# Patient Record
Sex: MALE | Race: BLACK OR AFRICAN AMERICAN | Employment: FULL TIME | URBAN - METROPOLITAN AREA
[De-identification: names, ages, dates, MRNs, and addresses within clinical notes are randomized per-mention and may not be internally consistent; named-entity substitution may affect disease eponyms.]

---

## 2022-03-19 PROBLEM — Z88.9 H/O SEASONAL ALLERGIES: Status: ACTIVE | Noted: 2017-09-22

## 2022-07-21 RX ORDER — FLUTICASONE PROPIONATE AND SALMETEROL 100; 50 UG/1; UG/1
1 POWDER RESPIRATORY (INHALATION) EVERY 12 HOURS
COMMUNITY
Start: 2022-05-03

## 2022-07-21 RX ORDER — LORATADINE 10 MG/1
10 TABLET ORAL DAILY
COMMUNITY

## 2022-07-21 RX ORDER — ALBUTEROL SULFATE 90 UG/1
2 AEROSOL, METERED RESPIRATORY (INHALATION) EVERY 4 HOURS PRN
COMMUNITY
Start: 2022-02-14

## 2022-07-21 RX ORDER — BROMPHENIRAMINE MALEATE, PSEUDOEPHEDRINE HYDROCHLORIDE, AND DEXTROMETHORPHAN HYDROBROMIDE 2; 30; 10 MG/5ML; MG/5ML; MG/5ML
5 SYRUP ORAL 4 TIMES DAILY PRN
COMMUNITY
Start: 2022-05-02

## 2022-07-21 RX ORDER — AZELASTINE HYDROCHLORIDE, FLUTICASONE PROPIONATE 137; 50 UG/1; UG/1
SPRAY, METERED NASAL
COMMUNITY
Start: 2022-05-02

## 2022-07-21 RX ORDER — FLUTICASONE PROPIONATE 50 MCG
2 SPRAY, SUSPENSION (ML) NASAL PRN
COMMUNITY
Start: 2022-02-14

## 2022-07-21 RX ORDER — BUDESONIDE AND FORMOTEROL FUMARATE DIHYDRATE 80; 4.5 UG/1; UG/1
2 AEROSOL RESPIRATORY (INHALATION) 2 TIMES DAILY
COMMUNITY
End: 2022-09-06

## 2022-07-26 ENCOUNTER — OFFICE VISIT (OUTPATIENT)
Dept: OCCUPATIONAL MEDICINE | Age: 42
End: 2022-07-26

## 2022-07-26 DIAGNOSIS — B37.2 YEAST DERMATITIS: Primary | ICD-10-CM

## 2022-07-26 PROCEDURE — 99213 OFFICE O/P EST LOW 20 MIN: CPT | Performed by: NURSE PRACTITIONER

## 2022-07-28 ASSESSMENT — ENCOUNTER SYMPTOMS
ABDOMINAL PAIN: 0
SHORTNESS OF BREATH: 0

## 2022-07-28 NOTE — PROGRESS NOTES
PROGRESS NOTE    SUBJECTIVE:   Todd Grover is a 38 y.o. male seen for a follow up visit regarding Rash   Dermatitis: Patient complains of a rash. Symptoms began several weeks ago. Patient describes the rash as nonpigmented, excoriated, scattered. Characteristics of rash and associated history: Personal hx of allergies, asthma or hay fever? yes. Patient's previous dermatologic history includes eczema. Family history of derm problems: eczema. Medications currently using: none. Environmental exposures or allergies: none      Past Medical History, Past Surgical History, Family history, Social History, and Medications were all reviewed with the patient today and updated as necessary. Current Outpatient Medications   Medication Sig Dispense Refill    albuterol sulfate HFA (PROVENTIL;VENTOLIN;PROAIR) 108 (90 Base) MCG/ACT inhaler Inhale 2 puffs into the lungs every 4 hours as needed      Azelastine-Fluticasone 137-50 MCG/ACT SUSP SPRAY 1 SPRAY IN EACH NOSTRIL 2 TIMES A DAY      fluticasone (FLONASE) 50 MCG/ACT nasal spray 2 sprays by Nasal route as needed      fluticasone-salmeterol (ADVIAR) 100-50 MCG/ACT AEPB diskus inhaler Inhale 1 puff into the lungs every 12 hours      brompheniramine-pseudoephedrine-DM 2-30-10 MG/5ML syrup Take 5 mLs by mouth 4 times daily as needed      nystatin-triamcinolone (MYCOLOG II) 961928-6.1 UNIT/GM-% cream Apply topically 2 times daily. 30 g 0    budesonide-formoterol (SYMBICORT) 80-4.5 MCG/ACT AERO Inhale 2 puffs into the lungs 2 times daily      loratadine (CLARITIN) 10 MG tablet Take 10 mg by mouth in the morning. No current facility-administered medications for this visit. Not on File  Patient Active Problem List   Diagnosis    H/O seasonal allergies     Past Medical History:   Diagnosis Date    H/O seasonal allergies 9/22/2017     No past surgical history on file. No family history on file.   Social History     Tobacco Use    Smoking status: Never    Smokeless tobacco: Not on file   Substance Use Topics    Alcohol use: Yes       Review of Systems   Eyes:  Negative for visual disturbance. Respiratory:  Negative for shortness of breath. Cardiovascular:  Negative for chest pain, palpitations and leg swelling. Gastrointestinal:  Negative for abdominal pain. Skin:  Positive for rash. Neurological:  Negative for dizziness, syncope and light-headedness. OBJECTIVE:  There were no vitals taken for this visit. Physical Exam  Constitutional:       Appearance: Normal appearance. Cardiovascular:      Rate and Rhythm: Normal rate and regular rhythm. Pulmonary:      Effort: Pulmonary effort is normal.      Breath sounds: Normal breath sounds. Skin:     Findings: Erythema and rash present. Neurological:      Mental Status: He is alert. Rash is a red flat rash with sharp, scalloped edges. ASSESSMENT and PLAN    Alejandra Williamson was seen today for rash. Diagnoses and all orders for this visit:    Yeast dermatitis    Other orders  -     nystatin-triamcinolone (MYCOLOG II) 179350-7.1 UNIT/GM-% cream; Apply topically 2 times daily. Use cream on affected area  Reviewed to stay dry. Reviewed triggers, prevention and hygiene.

## 2022-09-06 ENCOUNTER — TELEPHONE (OUTPATIENT)
Dept: OCCUPATIONAL MEDICINE | Age: 42
End: 2022-09-06

## 2022-09-06 RX ORDER — BROMPHENIRAMINE MALEATE, PSEUDOEPHEDRINE HYDROCHLORIDE, AND DEXTROMETHORPHAN HYDROBROMIDE 2; 30; 10 MG/5ML; MG/5ML; MG/5ML
5 SYRUP ORAL 4 TIMES DAILY PRN
Qty: 210 ML | Refills: 0 | Status: SHIPPED | OUTPATIENT
Start: 2022-09-06

## 2022-09-06 RX ORDER — BUDESONIDE AND FORMOTEROL FUMARATE DIHYDRATE 160; 4.5 UG/1; UG/1
2 AEROSOL RESPIRATORY (INHALATION) 2 TIMES DAILY
Qty: 10.2 G | Refills: 5 | Status: SHIPPED | OUTPATIENT
Start: 2022-09-06

## 2022-09-06 RX ORDER — BUDESONIDE AND FORMOTEROL FUMARATE DIHYDRATE 160; 4.5 UG/1; UG/1
2 AEROSOL RESPIRATORY (INHALATION) 2 TIMES DAILY
COMMUNITY
Start: 2022-02-14 | End: 2022-09-06 | Stop reason: SDUPTHER

## 2022-11-03 ENCOUNTER — OFFICE VISIT (OUTPATIENT)
Dept: OCCUPATIONAL MEDICINE | Age: 42
End: 2022-11-03

## 2022-11-03 DIAGNOSIS — J01.10 ACUTE FRONTAL SINUSITIS, RECURRENCE NOT SPECIFIED: Primary | ICD-10-CM

## 2022-11-03 RX ORDER — PREDNISONE 10 MG/1
10 TABLET ORAL DAILY
Qty: 7 TABLET | Refills: 0 | Status: SHIPPED | OUTPATIENT
Start: 2022-11-03 | End: 2022-11-10

## 2022-11-03 RX ORDER — DOXYCYCLINE HYCLATE 100 MG
100 TABLET ORAL 2 TIMES DAILY
Qty: 14 TABLET | Refills: 0 | Status: SHIPPED | OUTPATIENT
Start: 2022-11-03 | End: 2022-11-10

## 2022-11-08 ASSESSMENT — ENCOUNTER SYMPTOMS
VOMITING: 0
NAUSEA: 0
SORE THROAT: 1
SHORTNESS OF BREATH: 0
COUGH: 0

## 2022-11-08 NOTE — PROGRESS NOTES
PROGRESS NOTE    SUBJECTIVE:   Todd Grover is a 38 y.o. male seen for a follow up visit regarding Sinusitis   The patient is a 38 y.o. male who presents with complaint of achiness, congestion, nasal congestion, post nasal drip, rash, sinus pressure, sneezing, and tooth pain with no fever, chills, night sweats or weight loss. Onset of symptoms was several days ago, gradually worsening since that time. Treatment to date: oral decongestant, nasal decongestant spray, saline spray/neti pot , antihistamines. Past Medical History, Past Surgical History, Family history, Social History, and Medications were all reviewed with the patient today and updated as necessary. Current Outpatient Medications   Medication Sig Dispense Refill    doxycycline hyclate (VIBRA-TABS) 100 MG tablet Take 1 tablet by mouth 2 times daily for 7 days 14 tablet 0    predniSONE (DELTASONE) 10 MG tablet Take 1 tablet by mouth daily for 7 days 7 tablet 0    brompheniramine-pseudoephedrine-DM 2-30-10 MG/5ML syrup Take 5 mLs by mouth 4 times daily as needed for Congestion or Cough 210 mL 0    budesonide-formoterol (SYMBICORT) 160-4.5 MCG/ACT AERO Inhale 2 puffs into the lungs 2 times daily 10.2 g 5    albuterol sulfate HFA (PROVENTIL;VENTOLIN;PROAIR) 108 (90 Base) MCG/ACT inhaler Inhale 2 puffs into the lungs every 4 hours as needed      Azelastine-Fluticasone 137-50 MCG/ACT SUSP SPRAY 1 SPRAY IN EACH NOSTRIL 2 TIMES A DAY      fluticasone (FLONASE) 50 MCG/ACT nasal spray 2 sprays by Nasal route as needed      fluticasone-salmeterol (ADVIAR) 100-50 MCG/ACT AEPB diskus inhaler Inhale 1 puff into the lungs every 12 hours      loratadine (CLARITIN) 10 MG tablet Take 10 mg by mouth in the morning. brompheniramine-pseudoephedrine-DM 2-30-10 MG/5ML syrup Take 5 mLs by mouth 4 times daily as needed      nystatin-triamcinolone (MYCOLOG II) 360217-1.1 UNIT/GM-% cream Apply topically 2 times daily.  30 g 0     No current facility-administered medications for this visit. Not on File  Patient Active Problem List   Diagnosis    H/O seasonal allergies     Past Medical History:   Diagnosis Date    H/O seasonal allergies 9/22/2017     No past surgical history on file. No family history on file. Social History     Tobacco Use    Smoking status: Never    Smokeless tobacco: Not on file   Substance Use Topics    Alcohol use: Yes       Review of Systems   Constitutional:  Negative for chills and fatigue. HENT:  Positive for congestion, ear pain and sore throat. Respiratory:  Negative for cough and shortness of breath. Gastrointestinal:  Negative for nausea and vomiting. Musculoskeletal:  Negative for myalgias. Skin:  Negative for rash. Neurological:  Negative for headaches. OBJECTIVE:  There were no vitals taken for this visit. Physical Exam  Constitutional:       Appearance: Normal appearance. HENT:      Head: Normocephalic. Right Ear: Hearing and external ear normal. Swelling and tenderness present. A middle ear effusion is present. Left Ear: Hearing and external ear normal. Swelling and tenderness present. A middle ear effusion is present. Nose: Mucosal edema, congestion and rhinorrhea present. Rhinorrhea is purulent. Right Turbinates: Enlarged and swollen. Left Turbinates: Enlarged and swollen. Right Sinus: Maxillary sinus tenderness and frontal sinus tenderness present. Left Sinus: Maxillary sinus tenderness and frontal sinus tenderness present. Cardiovascular:      Rate and Rhythm: Normal rate and regular rhythm. Pulmonary:      Effort: Pulmonary effort is normal.      Breath sounds: Normal breath sounds. Neurological:      Mental Status: He is alert. ASSESSMENT and PLAN    Juju Edwards was seen today for sinusitis.     Diagnoses and all orders for this visit:    Acute frontal sinusitis, recurrence not specified    Other orders  -     doxycycline hyclate (VIBRA-TABS) 100 MG tablet; Take 1 tablet by mouth 2 times daily for 7 days  -     predniSONE (DELTASONE) 10 MG tablet; Take 1 tablet by mouth daily for 7 days      Saline rinses, flonase, zyrtec, take abx. Reviewed triggers, prevention and hygiene.    No improvement or worsening RTC

## 2022-12-20 ENCOUNTER — OFFICE VISIT (OUTPATIENT)
Dept: OCCUPATIONAL MEDICINE | Age: 42
End: 2022-12-20

## 2022-12-20 DIAGNOSIS — R09.81 SINUS CONGESTION: Primary | ICD-10-CM

## 2022-12-20 RX ORDER — PREDNISONE 10 MG/1
10 TABLET ORAL DAILY
Qty: 10 TABLET | Refills: 0 | Status: SHIPPED | OUTPATIENT
Start: 2022-12-20 | End: 2022-12-30

## 2022-12-20 RX ORDER — BROMPHENIRAMINE MALEATE, PSEUDOEPHEDRINE HYDROCHLORIDE, AND DEXTROMETHORPHAN HYDROBROMIDE 2; 30; 10 MG/5ML; MG/5ML; MG/5ML
5 SYRUP ORAL 4 TIMES DAILY PRN
Qty: 210 ML | Refills: 0 | Status: SHIPPED | OUTPATIENT
Start: 2022-12-20

## 2022-12-25 NOTE — PROGRESS NOTES
PROGRESS NOTE    SUBJECTIVE:   Todd Grover is a 35 y.o. male seen for a follow up visit regarding Allergies  The patient is a 35 y.o. male who presents with complaint of congestion, facial pain, nasal congestion, post nasal drip, sinus pressure and sneezing with no fever, chills, or night sweats. Onset of symptoms was 3 days ago, gradually worsening since that time. Treatment to date: decongestants, antihistamines, cough suppressants, nasal steroids. Past Medical History, Past Surgical History, Family history, Social History, and Medications were all reviewed with the patient today and updated as necessary. Current Outpatient Medications   Medication Sig Dispense Refill    brompheniramine-pseudoeph-DM (Bromfed DM) 2-30-10 mg/5 mL syrup Take 5 mL by mouth four (4) times daily as needed for Congestion, Cough or Rhinitis. 210 mL 0    amoxicillin-clavulanate (AUGMENTIN) 875-125 mg per tablet Take 1 Tablet by mouth every twelve (12) hours. 14 Tablet 0    albuterol (PROVENTIL HFA, VENTOLIN HFA, PROAIR HFA) 90 mcg/actuation inhaler Take 2 Puffs by inhalation every six (6) hours as needed for Wheezing. 1 Each 5    oxiconazole (OXISTAT) 1 % lotion Apply  to affected area two (2) times a day. 60 mL 2    nystatin (NYSTOP) powder Apply  to affected area four (4) times daily. 1 Bottle 3    terbinafine HCl (LAMISIL) 250 mg tablet Take 1 Tab by mouth daily. Indications: TOENAIL ONYCHOMYCOSIS 90 Tab 1     No Known Allergies  Patient Active Problem List   Diagnosis Code    H/O seasonal allergies Z88.9     Past Medical History:   Diagnosis Date    H/O seasonal allergies 9/22/2017     No past surgical history on file. No family history on file. Social History     Tobacco Use    Smoking status: Never Smoker    Smokeless tobacco: Not on file   Substance Use Topics    Alcohol use: Yes         Review of Systems   Constitutional: Negative for chills, fever and malaise/fatigue.    HENT: Positive for congestion, ear pain and sore throat. Cardiovascular: Negative for chest pain. Respiratory: Negative for cough and shortness of breath. Skin: Negative for rash. Musculoskeletal: Negative for myalgias. Gastrointestinal: Negative for nausea and vomiting. Neurological: Negative for headaches. OBJECTIVE:  There were no vitals taken for this visit. Physical Exam  Constitutional:       Appearance: Normal appearance. HENT:      Head: Normocephalic. Right Ear: Hearing and external ear normal. Swelling and tenderness present. Left Ear: External ear normal. Swelling and tenderness present. Nose: Mucosal edema, congestion and rhinorrhea present. Right Turbinates: Swollen and pale. Left Turbinates: Swollen and pale. Cardiovascular:      Rate and Rhythm: Normal rate and regular rhythm. Pulmonary:      Effort: Pulmonary effort is normal.      Breath sounds: Normal breath sounds. Neurological:      Mental Status: He is alert. ASSESSMENT and PLAN    Diagnoses and all orders for this visit:    use salien rinses, mucinex, allergy meds, and PRN cough syrup. If worsening no improvement in 72 hours can start steriods  Reviewed triggers, prevention and hygiene.